# Patient Record
Sex: MALE | Race: WHITE | NOT HISPANIC OR LATINO | Employment: FULL TIME | URBAN - METROPOLITAN AREA
[De-identification: names, ages, dates, MRNs, and addresses within clinical notes are randomized per-mention and may not be internally consistent; named-entity substitution may affect disease eponyms.]

---

## 2019-08-27 ASSESSMENT — ENCOUNTER SYMPTOMS
NAIL CHANGES: 0
SKIN CHANGES: 0
POOR WOUND HEALING: 0

## 2019-08-29 ENCOUNTER — OFFICE VISIT (OUTPATIENT)
Dept: INTERNAL MEDICINE | Facility: CLINIC | Age: 34
End: 2019-08-29
Payer: COMMERCIAL

## 2019-08-29 VITALS
HEART RATE: 81 BPM | SYSTOLIC BLOOD PRESSURE: 135 MMHG | OXYGEN SATURATION: 98 % | DIASTOLIC BLOOD PRESSURE: 83 MMHG | WEIGHT: 198.7 LBS

## 2019-08-29 DIAGNOSIS — Z13.220 LIPID SCREENING: ICD-10-CM

## 2019-08-29 DIAGNOSIS — Z13.220 LIPID SCREENING: Primary | ICD-10-CM

## 2019-08-29 LAB
CHOLEST SERPL-MCNC: 134 MG/DL
HDLC SERPL-MCNC: 55 MG/DL
LDLC SERPL CALC-MCNC: 73 MG/DL
NONHDLC SERPL-MCNC: 79 MG/DL
TRIGL SERPL-MCNC: 28 MG/DL

## 2019-08-29 RX ORDER — FLUTICASONE PROPIONATE 50 MCG
SPRAY, SUSPENSION (ML) NASAL
COMMUNITY
Start: 2014-08-12

## 2019-08-29 RX ORDER — DEXTROAMPHETAMINE SACCHARATE, AMPHETAMINE ASPARTATE, DEXTROAMPHETAMINE SULFATE AND AMPHETAMINE SULFATE 2.5; 2.5; 2.5; 2.5 MG/1; MG/1; MG/1; MG/1
TABLET ORAL
Refills: 0 | COMMUNITY
Start: 2018-10-16

## 2019-08-29 RX ORDER — OMEGA-3 FATTY ACIDS/FISH OIL 300-1000MG
200 CAPSULE ORAL EVERY 4 HOURS PRN
COMMUNITY

## 2019-08-29 ASSESSMENT — PAIN SCALES - GENERAL: PAINLEVEL: NO PAIN (0)

## 2019-08-29 NOTE — NURSING NOTE
Chief Complaint   Patient presents with     Establish Care     Pt here to establish care     Tia Angeles EMT at 1:31 PM sign on 8/29/2019

## 2019-08-29 NOTE — PROGRESS NOTES
PRIMARY CARE CENTER     Patient Name: Christoph Rutherford  YOB: 1985  MRN: [id number]    Date of Service: August 29, 2019  Chief Complaint: [facility] care          HISTORY OF PRESENT ILLNESS:    Christoph Rutherford is a 33 year old male with no significant PMH who presents to establish care.  He endorses occasional left shoulder pain at rest that has no triggering factors and is not worsened or precipitated by exertion.  He denies fevers, chills, headaches, lightheadedness, chest pain, SOB, abdominal pain, n/v, diarrhea, hematochezia, melena, dysuria, hematuria, leg swelling.           PAST MEDICAL HISTORY:   - patient denies previous medical history         PAST SURGICAL HISTORY:   - patient denies previous surgeries         ALLERGIES:      Allergies   Allergen Reactions     Cats Itching     Dust Mites Itching            HOME MEDICATIONS:     Current Outpatient Medications   Medication     amphetamine-dextroamphetamine (ADDERALL) 10 MG tablet     fluticasone (FLONASE) 50 MCG/ACT nasal spray     ibuprofen (ADVIL/MOTRIN) 200 MG capsule     No current facility-administered medications for this visit.             FAMILY HISTORY:     Brother - Crohn's  Maternal family: skin cancer uncertain of type  Father - guillain barre syndrome    Denies family hx of HTN and diabetes         SOCIAL HISTORY:     Exercise: rarely  Occupation: IT    Tobacco: occasionally smokes, reports he used to smoke in college and quit but then picked smoking back up after meeting is wife who smokes. They both now only smoke on vacations but have recently decided go quit since the patient's father-in-law recently had a heart attack  Alcohol: 1-2 drinks per week  Drugs: denies current of previous illicit drug use         REVIEW OF SYSTEMS:     10 point ROS was negative except as noted in HPI         PHYSICAL EXAM:   Vitals: /83 (BP Location: Right arm, Patient Position: Sitting, Cuff Size: Adult Regular)   Pulse 81   Wt 90.1 kg (198 lb  11.2 oz)   SpO2 98%     Wt Readings from Last 4 Encounters:   08/29/19 90.1 kg (198 lb 11.2 oz)       GENERAL: Alert, interactive, NAD  HEENT: Normocephalic, atraumatic, PERRL, EOMI, no conjunctivitis, anicteric sclera  NECK: supple  LUNGS: clear to auscultation bilaterally, no crackles or wheezes  HEART: regular rate and rhythm, normal S1 and S2, no murmur appreciated  ABDOMEN: Soft, nontender, nondistended. +BS,  EXTREMITIES: No LE edema bilaterally  NEURO: CN II-XII intact, 5/5 upper and lower extremity strength bilaterally, sensation intact, normal gait  PSYCH: A&O to person, place and time. appropriate mood, normal speech, linear thought.   SKIN warm/dry            ASSESSMENT & PLAN:   Christoph Rutherford is a 33 year old male with no significant PMH who presents to [facility] care.    Left shoulder pain  Pain is unlikely to be cardiac in nature as there is no correlation with exertion. Suspect this is related to anxiety with recent family member having a heart attack. Will continue to monitor at this time.    Healthcare Maintenance  - lipid profile fasting  - patient refused tdap vaccine at this time  - encouraged exercise and heathy eating  - encourage smoking cessation.    Patient care plan discussed with attending physician, Dr. Tera Dunn, who agreed with above.       Serafin Thompson MD  Internal Medicine, PGY-2  [id number]    While the patient was in clinic, I reviewed the pertinent medical history and results.  I discussed the current findings on physical examination, as well as the patient s diagnosis and treatment plan with the resident and agree with the information as documented with the following exceptions: none.  Tera Dunn MD

## 2020-03-02 ENCOUNTER — HEALTH MAINTENANCE LETTER (OUTPATIENT)
Age: 35
End: 2020-03-02

## 2020-12-20 ENCOUNTER — HEALTH MAINTENANCE LETTER (OUTPATIENT)
Age: 35
End: 2020-12-20

## 2021-04-05 NOTE — PROGRESS NOTES
SUBJECTIVE:   CC: Christoph Rutherford is an 35 year old male who presents for preventive health visit.          Healthy Habits:    Do you get at least three servings of calcium containing foods daily (dairy, green leafy vegetables, etc.)? yes    Amount of exercise or daily activities, outside of work: 1/2 hour(s) per day    Problems taking medications regularly No    Medication side effects: No    Have you had an eye exam in the past two years? no    Do you see a dentist twice per year? yes    Do you have sleep apnea, excessive snoring or daytime drowsiness?no     Encounter Diagnoses   Name Primary?     Routine general medical examination at a health care facility Yes     Epistaxis .rt nostril some days      Psoriasis Well controlled with syreoid cream      Hyperlipidemia LDL goal <130      Trochanteric bursitis of left hip at times when standing             Today's PHQ-2 Score:   PHQ-2 (  [employer]) 2019   Q1: Little interest or pleasure in doing things 0   Q2: Feeling down, depressed or hopeless 0   PHQ-2 Score 0   Q1: Little interest or pleasure in doing things Not at all   Q2: Feeling down, depressed or hopeless Not at all   PHQ-2 Score 0       Abuse: Current or Past(Physical, Sexual or Emotional)- No  Do you feel safe in your environment? Yes    Have you ever done Advance Care Planning? (For example, a Health Directive, POLST, or a discussion with a medical provider or your loved ones about your wishes):     Social History     Tobacco Use     Smoking status: Former Smoker     Types: Cigarettes     Start date: 2004     Quit date: 2017     Years since quittin.2   Substance Use Topics     Alcohol use: Yes     Frequency: 4 or more times a week     Drinks per session: 1 or 2     If you drink alcohol do you typically have >3 drinks per day or >7 drinks per week? No                      Last PSA: No results found for: PSA    Reviewed orders with patient. Reviewed health maintenance and updated orders  "accordingly - Yes  Lab work is in process    Reviewed and updated as needed this visit by clinical staff   Allergies  Meds              Reviewed and updated as needed this visit by Provider                No past medical history on file.     ROS:  CONSTITUTIONAL: NEGATIVE for fever, chills, change in weight  INTEGUMENTARY/SKIN: NEGATIVE for worrisome rashes, moles or lesions  EYES: NEGATIVE for vision changes or irritation  ENT: NEGATIVE for ear, mouth and throat problems  RESP: NEGATIVE for significant cough or SOB  CV: NEGATIVE for chest pain, palpitations or peripheral edema  GI: NEGATIVE for nausea, abdominal pain, heartburn, or change in bowel habits   male: negative for dysuria, hematuria, decreased urinary stream, erectile dysfunction, urethral discharge  MUSCULOSKELETAL: NEGATIVE for significant arthralgias or myalgia  NEURO: NEGATIVE for weakness, dizziness or paresthesias  PSYCHIATRIC: NEGATIVE for changes in mood or affect    OBJECTIVE:   /83   Pulse 60   Temp 99.1  F (37.3  C) (Temporal)   Ht 1.867 m (6' 1.5\")   Wt 97.5 kg (215 lb)   SpO2 98%   BMI 27.98 kg/m    EXAM:  GENERAL: healthy, alert and no distress  EYES: Eyes grossly normal to inspection, PERRL and conjunctivae and sclerae normal  HENT: normal cephalic/atraumatic, ear canals and TM's normal, nose and mouth without ulcers or lesions, oropharynx clear, oral mucous membranes moist and nose right nostril shoe=ws punctate bleeding in kisselbacks region  NECK: no adenopathy, no asymmetry, masses, or scars and thyroid normal to palpation  RESP: lungs clear to auscultation - no rales, rhonchi or wheezes  CV: regular rate and rhythm, normal S1 S2, no S3 or S4, no murmur, click or rub, no peripheral edema and peripheral pulses strong  ABDOMEN: soft, nontender, no hepatosplenomegaly, no masses and bowel sounds normal   (male): normal male genitalia without lesions or urethral discharge, no hernia  MS: no gross musculoskeletal defects " "noted, no edema  MS: tenderness to palpation left trochanteric bursa  SKIN: no suspicious lesions or rashes  NEURO: Normal strength and tone, mentation intact and speech normal  PSYCH: mentation appears normal, affect normal/bright  LYMPH: no cervical, supraclavicular, axillary, or inguinal adenopathy    Diagnostic Test Results:  Labs reviewed in Epic    ASSESSMENT/PLAN:   1. Routine general medical examination at a health care facility  Overall in good health, needs to exercise  - **TSH with free T4 reflex FUTURE anytime; Future  - **Comprehensive metabolic panel FUTURE anytime; Future    2. Epistaxis  Use bacitracin oint bid to area for a wek  - **CBC with platelets FUTURE anytime; Future    3. Psoriasis  Well controlled     4. Hyperlipidemia LDL goal <130  recheck  - Lipid panel reflex to direct LDL Fasting; Future    5. Trochanteric bursitis of left hip  Ice as needed, avoid painful activities       COUNSELING:  Reviewed preventive health counseling, as reflected in patient instructions       Regular exercise       Healthy diet/nutrition       Vision screening       Family planning       Safe sex practices/STD prevention    Estimated body mass index is 27.98 kg/m  as calculated from the following:    Height as of this encounter: 1.867 m (6' 1.5\").    Weight as of this encounter: 97.5 kg (215 lb).        He reports that he quit smoking about 4 years ago. His smoking use included cigarettes. He started smoking about 17 years ago. He does not have any smokeless tobacco history on file.      Counseling Resources:  ATP IV Guidelines  Pooled Cohorts Equation Calculator  FRAX Risk Assessment  ICSI Preventive Guidelines  Dietary Guidelines for Americans, 2010  USDA's MyPlate  ASA Prophylaxis  Lung CA Screening    Tre Ulloa MD  [facility]  "

## 2021-04-06 ENCOUNTER — OFFICE VISIT (OUTPATIENT)
Dept: FAMILY MEDICINE | Facility: CLINIC | Age: 36
End: 2021-04-06
Payer: COMMERCIAL

## 2021-04-06 VITALS
OXYGEN SATURATION: 98 % | SYSTOLIC BLOOD PRESSURE: 130 MMHG | BODY MASS INDEX: 27.59 KG/M2 | WEIGHT: 215 LBS | TEMPERATURE: 99.1 F | HEART RATE: 60 BPM | DIASTOLIC BLOOD PRESSURE: 83 MMHG | HEIGHT: 74 IN

## 2021-04-06 DIAGNOSIS — M70.62 TROCHANTERIC BURSITIS OF LEFT HIP: ICD-10-CM

## 2021-04-06 DIAGNOSIS — E78.5 HYPERLIPIDEMIA LDL GOAL <130: ICD-10-CM

## 2021-04-06 DIAGNOSIS — R04.0 EPISTAXIS: ICD-10-CM

## 2021-04-06 DIAGNOSIS — L40.9 PSORIASIS: ICD-10-CM

## 2021-04-06 DIAGNOSIS — Z00.00 ROUTINE GENERAL MEDICAL EXAMINATION AT A HEALTH CARE FACILITY: ICD-10-CM

## 2021-04-06 DIAGNOSIS — Z00.00 ROUTINE GENERAL MEDICAL EXAMINATION AT A HEALTH CARE FACILITY: Primary | ICD-10-CM

## 2021-04-06 LAB
ERYTHROCYTE [DISTWIDTH] IN BLOOD BY AUTOMATED COUNT: 12.6 % (ref 10–15)
HCT VFR BLD AUTO: 40.7 % (ref 40–53)
HGB BLD-MCNC: 13.6 G/DL (ref 13.3–17.7)
MCH RBC QN AUTO: 30.4 PG (ref 26.5–33)
MCHC RBC AUTO-ENTMCNC: 33.4 G/DL (ref 31.5–36.5)
MCV RBC AUTO: 91 FL (ref 78–100)
PLATELET # BLD AUTO: 152 10E9/L (ref 150–450)
RBC # BLD AUTO: 4.48 10E12/L (ref 4.4–5.9)
WBC # BLD AUTO: 5 10E9/L (ref 4–11)

## 2021-04-06 PROCEDURE — 85027 COMPLETE CBC AUTOMATED: CPT | Performed by: FAMILY MEDICINE

## 2021-04-06 PROCEDURE — 84443 ASSAY THYROID STIM HORMONE: CPT | Performed by: FAMILY MEDICINE

## 2021-04-06 PROCEDURE — 80061 LIPID PANEL: CPT | Performed by: FAMILY MEDICINE

## 2021-04-06 PROCEDURE — 36415 COLL VENOUS BLD VENIPUNCTURE: CPT | Performed by: FAMILY MEDICINE

## 2021-04-06 PROCEDURE — 80053 COMPREHEN METABOLIC PANEL: CPT | Performed by: FAMILY MEDICINE

## 2021-04-06 PROCEDURE — 99385 PREV VISIT NEW AGE 18-39: CPT | Performed by: FAMILY MEDICINE

## 2021-04-06 SDOH — HEALTH STABILITY: MENTAL HEALTH: HOW OFTEN DO YOU HAVE 6 OR MORE DRINKS ON ONE OCCASION?: NOT ASKED

## 2021-04-06 SDOH — HEALTH STABILITY: MENTAL HEALTH: HOW OFTEN DO YOU HAVE A DRINK CONTAINING ALCOHOL?: 4 OR MORE TIMES A WEEK

## 2021-04-06 SDOH — HEALTH STABILITY: MENTAL HEALTH: HOW MANY STANDARD DRINKS CONTAINING ALCOHOL DO YOU HAVE ON A TYPICAL DAY?: 1 OR 2

## 2021-04-06 ASSESSMENT — MIFFLIN-ST. JEOR: SCORE: 1972.04

## 2021-04-06 NOTE — PROGRESS NOTES
Writer notes vasovagal episodes during lab blood draw with loss of consciousness - no convulsions/seizure like activity, pale facial skin color - patient OK moments after - reports fasting today has not eaten or drank much (no fasting labs) - labs notes blood was very viscous - patient intake -  juice 8 oz , animal crackers - color improved - feeling of fainting improved - blood pressure continues to be low BP 99/54 P 60 SATS 98% RA- had patient sit in room for 15 minutes was able to stand and ambulate well - /83 with recheck - patient symptoms have improves feels OK to leave clinic - writer encouraged adequate fluid intake always prior to lab draw, please eat a good meal with protein today, may always request to have room draw to prevent vasovagal - patient verbalized understanding

## 2021-04-07 LAB
ALBUMIN SERPL-MCNC: 4.2 G/DL (ref 3.4–5)
ALP SERPL-CCNC: 58 U/L (ref 40–150)
ALT SERPL W P-5'-P-CCNC: 20 U/L (ref 0–70)
ANION GAP SERPL CALCULATED.3IONS-SCNC: 8 MMOL/L (ref 3–14)
AST SERPL W P-5'-P-CCNC: 14 U/L (ref 0–45)
BILIRUB SERPL-MCNC: 0.7 MG/DL (ref 0.2–1.3)
BUN SERPL-MCNC: 12 MG/DL (ref 7–30)
CALCIUM SERPL-MCNC: 9 MG/DL (ref 8.5–10.1)
CHLORIDE SERPL-SCNC: 105 MMOL/L (ref 94–109)
CHOLEST SERPL-MCNC: 150 MG/DL
CO2 SERPL-SCNC: 27 MMOL/L (ref 20–32)
CREAT SERPL-MCNC: 0.93 MG/DL (ref 0.66–1.25)
GFR SERPL CREATININE-BSD FRML MDRD: >90 ML/MIN/{1.73_M2}
GLUCOSE SERPL-MCNC: 84 MG/DL (ref 70–99)
HDLC SERPL-MCNC: 57 MG/DL
LDLC SERPL CALC-MCNC: 79 MG/DL
NONHDLC SERPL-MCNC: 93 MG/DL
POTASSIUM SERPL-SCNC: 3.5 MMOL/L (ref 3.4–5.3)
PROT SERPL-MCNC: 7.2 G/DL (ref 6.8–8.8)
SODIUM SERPL-SCNC: 140 MMOL/L (ref 133–144)
TRIGL SERPL-MCNC: 71 MG/DL
TSH SERPL DL<=0.005 MIU/L-ACNC: 0.45 MU/L (ref 0.4–4)

## 2021-10-03 ENCOUNTER — HEALTH MAINTENANCE LETTER (OUTPATIENT)
Age: 36
End: 2021-10-03

## 2022-05-14 ENCOUNTER — HEALTH MAINTENANCE LETTER (OUTPATIENT)
Age: 37
End: 2022-05-14

## 2022-09-04 ENCOUNTER — HEALTH MAINTENANCE LETTER (OUTPATIENT)
Age: 37
End: 2022-09-04

## 2023-06-03 ENCOUNTER — HEALTH MAINTENANCE LETTER (OUTPATIENT)
Age: 38
End: 2023-06-03